# Patient Record
Sex: FEMALE | Race: WHITE | URBAN - METROPOLITAN AREA
[De-identification: names, ages, dates, MRNs, and addresses within clinical notes are randomized per-mention and may not be internally consistent; named-entity substitution may affect disease eponyms.]

---

## 2019-04-09 ENCOUNTER — Encounter (OUTPATIENT)
Dept: URBAN - METROPOLITAN AREA CLINIC 71 | Facility: CLINIC | Age: 79
End: 2019-04-09
Payer: MEDICARE

## 2019-04-09 PROCEDURE — 92014 COMPRE OPH EXAM EST PT 1/>: CPT | Performed by: OPHTHALMOLOGY

## 2019-04-09 PROCEDURE — 92134 CPTRZ OPH DX IMG PST SGM RTA: CPT | Performed by: OPHTHALMOLOGY

## 2019-11-05 ENCOUNTER — Encounter (OUTPATIENT)
Dept: URBAN - METROPOLITAN AREA CLINIC 71 | Facility: CLINIC | Age: 79
End: 2019-11-05
Payer: MEDICARE

## 2019-11-05 PROCEDURE — 92134 CPTRZ OPH DX IMG PST SGM RTA: CPT | Performed by: OPHTHALMOLOGY

## 2019-11-05 PROCEDURE — 99213 OFFICE O/P EST LOW 20 MIN: CPT | Performed by: OPHTHALMOLOGY

## 2019-12-03 ENCOUNTER — Encounter (OUTPATIENT)
Dept: URBAN - METROPOLITAN AREA CLINIC 71 | Facility: CLINIC | Age: 79
End: 2019-12-03
Payer: MEDICARE

## 2019-12-03 PROCEDURE — 92012 INTRM OPH EXAM EST PATIENT: CPT | Performed by: OPHTHALMOLOGY

## 2019-12-05 ENCOUNTER — SURGERY (OUTPATIENT)
Dept: URBAN - METROPOLITAN AREA SURGERY 44 | Facility: SURGERY | Age: 79
End: 2019-12-05
Payer: MEDICARE

## 2019-12-05 PROCEDURE — 66821 AFTER CATARACT LASER SURGERY: CPT | Performed by: OPHTHALMOLOGY

## 2019-12-17 ENCOUNTER — Encounter (OUTPATIENT)
Dept: URBAN - METROPOLITAN AREA CLINIC 71 | Facility: CLINIC | Age: 79
End: 2019-12-17

## 2019-12-17 PROCEDURE — 99024 POSTOP FOLLOW-UP VISIT: CPT | Performed by: OPHTHALMOLOGY

## 2020-06-16 ENCOUNTER — OFFICE VISIT (OUTPATIENT)
Dept: URBAN - METROPOLITAN AREA CLINIC 71 | Facility: CLINIC | Age: 80
End: 2020-06-16
Payer: MEDICARE

## 2020-06-16 PROCEDURE — 92014 COMPRE OPH EXAM EST PT 1/>: CPT | Performed by: OPHTHALMOLOGY

## 2020-06-16 RX ORDER — CETIRIZINE HCL 10 MG
10 MG CAPSULE ORAL ONCE DAILY
Qty: 0 | Refills: 0 | Status: ACTIVE
Start: 2020-06-16

## 2020-06-16 ASSESSMENT — KERATOMETRY
OD: 44.13
OS: 44.75

## 2020-06-16 ASSESSMENT — INTRAOCULAR PRESSURE
OD: 15
OS: 16

## 2020-06-16 NOTE — IMPRESSION/PLAN
Impression: Nonexudative age-related macular degeneration, bilateral, early dry stage: H35.3131. Stable. Plan: The clinical exam is stable without evidence of progression. The patient was advised to continue [employer] multivitamins. The patient was also advised to continue to monitor the [place] grid, and call us immediately with any visual changes. Will have patient return in 6 months for dilation and OCT with refraction.

## 2020-12-22 ENCOUNTER — OFFICE VISIT (OUTPATIENT)
Dept: URBAN - METROPOLITAN AREA CLINIC 71 | Facility: CLINIC | Age: 80
End: 2020-12-22
Payer: MEDICARE

## 2020-12-22 DIAGNOSIS — H35.373 PUCKERING OF MACULA, BILATERAL: ICD-10-CM

## 2020-12-22 DIAGNOSIS — H52.4 PRESBYOPIA: ICD-10-CM

## 2020-12-22 PROCEDURE — 92014 COMPRE OPH EXAM EST PT 1/>: CPT | Performed by: OPHTHALMOLOGY

## 2020-12-22 ASSESSMENT — VISUAL ACUITY
OD: 20/20
OS: 20/20

## 2020-12-22 ASSESSMENT — INTRAOCULAR PRESSURE
OD: 12
OS: 13

## 2020-12-22 NOTE — IMPRESSION/PLAN
Impression: Nonexudative age-related macular degeneration, bilateral, early dry stage: H35.3131. OCT ordered. OS worse than OD. Plan: The clinical exam and OCT are stable without evidence of progression. The patient was advised to continue [employer]s multivitamins. The patient was also advised to continue to monitor the [place]msler grid, and call us immediately with any visual changes. Patient to continue to be seen on a 6 months basis.

## 2020-12-22 NOTE — IMPRESSION/PLAN
Impression: Vitreous degeneration, bilateral: H43.813. Plan: No holes or tears. Continue to monitor for changes.

## 2020-12-22 NOTE — IMPRESSION/PLAN
Impression: Puckering of macula, bilateral: H35.373. Plan: No treatment recommended at this time. No effect on vision. Continue to monitor.

## 2021-06-22 ENCOUNTER — OFFICE VISIT (OUTPATIENT)
Dept: URBAN - METROPOLITAN AREA CLINIC 71 | Facility: CLINIC | Age: 81
End: 2021-06-22
Payer: MEDICARE

## 2021-06-22 PROCEDURE — 92134 CPTRZ OPH DX IMG PST SGM RTA: CPT | Performed by: OPHTHALMOLOGY

## 2021-06-22 PROCEDURE — 99213 OFFICE O/P EST LOW 20 MIN: CPT | Performed by: OPHTHALMOLOGY

## 2021-06-22 ASSESSMENT — INTRAOCULAR PRESSURE
OS: 15
OD: 13

## 2021-06-22 NOTE — IMPRESSION/PLAN
Impression: Nonexudative age-related macular degeneration, bilateral, early dry stage: H35.3131. OCT ordered and reviewed today. No leakage seen. Stable. OS worse than OD. Plan: The clinical exam and OCT are stable without evidence of progression. The patient was advised to continue AR[employer]s multivitamins. The patient was also advised to continue to monitor the [place]msler grid, and call us immediately with any visual changes. Patient to continue to be seen on a 6 months basis.

## 2021-06-22 NOTE — IMPRESSION/PLAN
Impression: Other secondary cataract, left eye: H26.492. Stable. Plan: No treatment currently recommended. The patient will monitor vision changes and contact us with any decrease in vision.

## 2022-01-11 ENCOUNTER — OFFICE VISIT (OUTPATIENT)
Dept: URBAN - METROPOLITAN AREA CLINIC 71 | Facility: CLINIC | Age: 82
End: 2022-01-11
Payer: MEDICARE

## 2022-01-11 DIAGNOSIS — H43.813 VITREOUS DEGENERATION, BILATERAL: ICD-10-CM

## 2022-01-11 DIAGNOSIS — Z96.1 PRESENCE OF INTRAOCULAR LENS: ICD-10-CM

## 2022-01-11 DIAGNOSIS — H26.492 OTHER SECONDARY CATARACT, LEFT EYE: ICD-10-CM

## 2022-01-11 DIAGNOSIS — H35.3131 NONEXUDATIVE AGE-RELATED MACULAR DEGENERATION, BILATERAL, EARLY DRY STAGE: Primary | ICD-10-CM

## 2022-01-11 PROCEDURE — 99213 OFFICE O/P EST LOW 20 MIN: CPT | Performed by: OPHTHALMOLOGY

## 2022-01-11 PROCEDURE — 92134 CPTRZ OPH DX IMG PST SGM RTA: CPT | Performed by: OPHTHALMOLOGY

## 2022-01-11 ASSESSMENT — INTRAOCULAR PRESSURE
OS: 15
OD: 16
OS: 16
OD: 15

## 2022-01-11 ASSESSMENT — KERATOMETRY
OS: 44.38
OD: 43.63

## 2022-01-11 NOTE — IMPRESSION/PLAN
Impression: Puckering of macula, bilateral: H35.373. Stable. Plan: No surgical treatment recommended at this time. Patient to call with any changes or disortion in vision.

## 2022-01-11 NOTE — IMPRESSION/PLAN
Impression: Nonexudative age-related macular degeneration, bilateral, early dry stage: H35.3131. OCT ordered and reviewed today. No leakage seen. Stable. OS>OD. Plan: The clinical exam and OCT are stable without evidence of progression. The patient was advised to continue [employer] multivitamins. The patient was also advised to continue to monitor the [place]msler grid, and call us immediately with any visual changes. Patient to continue to be seen on a 6 months basis with dilation and OCT testing.

## 2022-07-18 ENCOUNTER — OFFICE VISIT (OUTPATIENT)
Dept: URBAN - METROPOLITAN AREA CLINIC 71 | Facility: CLINIC | Age: 82
End: 2022-07-18
Payer: MEDICARE

## 2022-07-18 DIAGNOSIS — H26.492 OTHER SECONDARY CATARACT, LEFT EYE: ICD-10-CM

## 2022-07-18 DIAGNOSIS — H35.3131 NONEXUDATIVE AGE-RELATED MACULAR DEGENERATION, BILATERAL, EARLY DRY STAGE: Primary | ICD-10-CM

## 2022-07-18 DIAGNOSIS — H04.123 DRY EYE SYNDROME OF BILATERAL LACRIMAL GLANDS: ICD-10-CM

## 2022-07-18 DIAGNOSIS — H43.813 VITREOUS DEGENERATION, BILATERAL: ICD-10-CM

## 2022-07-18 DIAGNOSIS — Z96.1 PRESENCE OF INTRAOCULAR LENS: ICD-10-CM

## 2022-07-18 DIAGNOSIS — H35.373 PUCKERING OF MACULA, BILATERAL: ICD-10-CM

## 2022-07-18 PROCEDURE — 99212 OFFICE O/P EST SF 10 MIN: CPT | Performed by: OPHTHALMOLOGY

## 2022-07-18 PROCEDURE — 92134 CPTRZ OPH DX IMG PST SGM RTA: CPT | Performed by: OPHTHALMOLOGY

## 2022-07-18 ASSESSMENT — INTRAOCULAR PRESSURE
OD: 17
OS: 19

## 2022-07-18 NOTE — IMPRESSION/PLAN
Impression: Nonexudative age-related macular degeneration, bilateral, early dry stage: H35.3131. OCT ordered and reviewed today. Testing is remaining stable. No leakage seen. Plan: Recommend use of ocular vitamins and monitoring of the amsler grid. Patient to call if any changes in vision or distortions occur. Return in 6 months for dilation and repeat OCT testing.

## 2022-07-18 NOTE — IMPRESSION/PLAN
Impression: Dry eye syndrome of bilateral lacrimal glands: H04.123. Symptomatic. Plan: Recommend use of artificial tears as needed. Okay to use multiple times daily in both eyes. Patient can also try hot compresses for 5-10 minutes at a time, twice daily.

## 2022-07-18 NOTE — IMPRESSION/PLAN
Impression: Other secondary cataract, left eye: H26.492. Worsening. Progression seen today. Discussed the option of a YAG laser vs monitoring for progression. Plan: Patient elects to proceed with YAG laser OS only. Return for procedure.

## 2022-09-20 ENCOUNTER — POST-OPERATIVE VISIT (OUTPATIENT)
Dept: URBAN - METROPOLITAN AREA CLINIC 71 | Facility: CLINIC | Age: 82
End: 2022-09-20
Payer: MEDICARE

## 2022-09-20 DIAGNOSIS — Z48.810 ENCOUNTER FOR SURGICAL AFTERCARE FOLLOWING SURGERY ON A SENSE ORGAN: Primary | ICD-10-CM

## 2022-09-20 PROCEDURE — 99024 POSTOP FOLLOW-UP VISIT: CPT | Performed by: OPHTHALMOLOGY

## 2022-09-20 ASSESSMENT — INTRAOCULAR PRESSURE
OS: 12
OD: 14

## 2022-09-20 NOTE — IMPRESSION/PLAN
Impression: S/P YAG Capsulotomy (Yttrium Aluminum Marblem[place]) OS - 12 Days. Good capsulotomy. Vision has improved. Plan: [place], but we recommend she see an eye care provider in about 6 months for dilation.